# Patient Record
Sex: MALE | Race: WHITE | NOT HISPANIC OR LATINO
[De-identification: names, ages, dates, MRNs, and addresses within clinical notes are randomized per-mention and may not be internally consistent; named-entity substitution may affect disease eponyms.]

---

## 2018-07-17 PROBLEM — Z00.00 ENCOUNTER FOR PREVENTIVE HEALTH EXAMINATION: Noted: 2018-07-17

## 2018-10-23 ENCOUNTER — APPOINTMENT (OUTPATIENT)
Dept: HEART AND VASCULAR | Facility: CLINIC | Age: 53
End: 2018-10-23

## 2018-10-31 ENCOUNTER — APPOINTMENT (OUTPATIENT)
Dept: HEART AND VASCULAR | Facility: CLINIC | Age: 53
End: 2018-10-31
Payer: COMMERCIAL

## 2018-10-31 VITALS — WEIGHT: 140 LBS | BODY MASS INDEX: 23.9 KG/M2 | HEIGHT: 64 IN

## 2018-10-31 VITALS — DIASTOLIC BLOOD PRESSURE: 70 MMHG | SYSTOLIC BLOOD PRESSURE: 120 MMHG

## 2018-10-31 DIAGNOSIS — Z85.51 PERSONAL HISTORY OF MALIGNANT NEOPLASM OF BLADDER: ICD-10-CM

## 2018-10-31 DIAGNOSIS — Z87.09 PERSONAL HISTORY OF OTHER DISEASES OF THE RESPIRATORY SYSTEM: ICD-10-CM

## 2018-10-31 PROCEDURE — 99213 OFFICE O/P EST LOW 20 MIN: CPT

## 2018-12-04 ENCOUNTER — APPOINTMENT (OUTPATIENT)
Dept: HEART AND VASCULAR | Facility: CLINIC | Age: 53
End: 2018-12-04

## 2020-09-14 ENCOUNTER — APPOINTMENT (OUTPATIENT)
Dept: HEART AND VASCULAR | Facility: CLINIC | Age: 55
End: 2020-09-14
Payer: COMMERCIAL

## 2020-09-14 ENCOUNTER — NON-APPOINTMENT (OUTPATIENT)
Age: 55
End: 2020-09-14

## 2020-09-14 VITALS — SYSTOLIC BLOOD PRESSURE: 120 MMHG | DIASTOLIC BLOOD PRESSURE: 70 MMHG

## 2020-09-14 VITALS — HEIGHT: 64 IN | BODY MASS INDEX: 22.2 KG/M2 | WEIGHT: 130 LBS | HEART RATE: 64 BPM | RESPIRATION RATE: 14 BRPM

## 2020-09-14 DIAGNOSIS — Z00.00 ENCOUNTER FOR GENERAL ADULT MEDICAL EXAMINATION W/OUT ABNORMAL FINDINGS: ICD-10-CM

## 2020-09-14 PROCEDURE — 93000 ELECTROCARDIOGRAM COMPLETE: CPT

## 2020-09-14 PROCEDURE — 99214 OFFICE O/P EST MOD 30 MIN: CPT

## 2020-09-14 NOTE — HISTORY OF PRESENT ILLNESS
[FreeTextEntry1] : Patient is a 55-year-old white male with long-standing smoking use was evaluated earlier this year for symptoms of breathlessness ETT done in our office in June and did not reveal any ST changes suggestive of ischemia prior carotid examination did reveal some atherosclerotic plaque.\par Patient is currently down to 10 cigarettes a day from 20\par No recurrent sscp or worseing dyspne a\par Remains at work guarding [employer]

## 2020-09-14 NOTE — PHYSICAL EXAM
[Normal Appearance] : normal appearance [General Appearance - Well Developed] : well developed [Well Groomed] : well groomed [General Appearance - Well Nourished] : well nourished [No Deformities] : no deformities [General Appearance - In No Acute Distress] : no acute distress [Normal Conjunctiva] : the conjunctiva exhibited no abnormalities [Eyelids - No Xanthelasma] : the eyelids demonstrated no xanthelasmas [Normal Oral Mucosa] : normal oral mucosa [No Oral Pallor] : no oral pallor [No Oral Cyanosis] : no oral cyanosis [Normal Jugular Venous A Waves Present] : normal jugular venous A waves present [Normal Jugular Venous V Waves Present] : normal jugular venous V waves present [No Jugular Venous Moss A Waves] : no jugular venous moss A waves [Respiration, Rhythm And Depth] : normal respiratory rhythm and effort [Exaggerated Use Of Accessory Muscles For Inspiration] : no accessory muscle use [Auscultation Breath Sounds / Voice Sounds] : lungs were clear to auscultation bilaterally [Heart Rate And Rhythm] : heart rate and rhythm were normal [Heart Sounds] : normal S1 and S2 [Murmurs] : no murmurs present [Abdomen Soft] : soft [Abdomen Tenderness] : non-tender [] : no hepato-splenomegaly [Abdomen Mass (___ Cm)] : no abdominal mass palpated

## 2020-09-24 ENCOUNTER — APPOINTMENT (OUTPATIENT)
Dept: HEART AND VASCULAR | Facility: CLINIC | Age: 55
End: 2020-09-24

## 2020-10-06 ENCOUNTER — APPOINTMENT (OUTPATIENT)
Dept: HEART AND VASCULAR | Facility: CLINIC | Age: 55
End: 2020-10-06
Payer: COMMERCIAL

## 2020-10-06 PROCEDURE — 93015 CV STRESS TEST SUPVJ I&R: CPT

## 2020-10-15 ENCOUNTER — APPOINTMENT (OUTPATIENT)
Dept: THORACIC SURGERY | Facility: CLINIC | Age: 55
End: 2020-10-15
Payer: COMMERCIAL

## 2020-10-27 VITALS
SYSTOLIC BLOOD PRESSURE: 120 MMHG | WEIGHT: 130 LBS | HEIGHT: 64 IN | BODY MASS INDEX: 22.2 KG/M2 | HEART RATE: 68 BPM | DIASTOLIC BLOOD PRESSURE: 84 MMHG

## 2020-10-29 ENCOUNTER — APPOINTMENT (OUTPATIENT)
Dept: THORACIC SURGERY | Facility: CLINIC | Age: 55
End: 2020-10-29
Payer: COMMERCIAL

## 2020-10-29 VITALS
TEMPERATURE: 97 F | DIASTOLIC BLOOD PRESSURE: 63 MMHG | WEIGHT: 132 LBS | BODY MASS INDEX: 22.53 KG/M2 | HEART RATE: 65 BPM | HEIGHT: 64 IN | RESPIRATION RATE: 17 BRPM | OXYGEN SATURATION: 96 % | SYSTOLIC BLOOD PRESSURE: 117 MMHG

## 2020-10-29 PROCEDURE — 99203 OFFICE O/P NEW LOW 30 MIN: CPT

## 2020-10-29 PROCEDURE — 99072 ADDL SUPL MATRL&STAF TM PHE: CPT

## 2020-10-29 NOTE — PHYSICAL EXAM
[General Appearance - Alert] : alert [General Appearance - In No Acute Distress] : in no acute distress [] : no respiratory distress [Respiration, Rhythm And Depth] : normal respiratory rhythm and effort [Exaggerated Use Of Accessory Muscles For Inspiration] : no accessory muscle use [Bowel Sounds] : normal bowel sounds [Abdomen Soft] : soft [Abnormal Walk] : normal gait [Musculoskeletal - Swelling] : no joint swelling seen [Skin Color & Pigmentation] : normal skin color and pigmentation [Skin Turgor] : normal skin turgor [Oriented To Time, Place, And Person] : oriented to person, place, and time [Impaired Insight] : insight and judgment were intact

## 2020-10-31 NOTE — HISTORY OF PRESENT ILLNESS
[FreeTextEntry1] : 55 year old male, current smoker, with a PMHx of COPD, bladder polyp?, SOB, with recent CT chest revealing slightly improved RML ground glass opacity. He presents today for an initial evaluation. He is referred by Dr. Giles.\par \par CT chest completed on 7/9/2020:\par - continued but slightly improved right middle lobe pulmonary ground glass opacity, liekly infectious of inflammatory\par

## 2020-10-31 NOTE — ASSESSMENT
[FreeTextEntry1] : 55 year old male, current smoker ( 40 years), with a PMHx of COPD, bladder polyp?, SOB, with recent CT chest revealing slightly improved RML ground glass opacity. He presents today for an initial evaluation. He is referred by Dr. Giles.\par \par Today the patient reports feeling generally well. He denies pain, fever, chills, SOB and cough. CT chest completed on 7/9/2020: was reviewed which revealed continued but slightly improved right middle lobe pulmonary ground glass opacity, likely infectious of inflammatory. Compared this scan to his previous CT scans done at [facility]. Emphysema  and flattened diaphragm seen. Discussed the importance of smoking cessation. Will order PFTs and CT chest for further evaluation. Will refer to Dr. Cortes. RTC. \par \par PLAN:\par 1. PFTs\par 2. CT chest\par 3. Refer to Dr Cortes\par 4. Smoking cessation encouraged\par \par Benefits of smoking cessation include:\par \par "1.  Your blood pressure will drop back down to normal within 20 minutes. \par \par 2.  Within eight hours the carbon monoxide levels in your bloodstream should decrease by about one half and oxygen level should return to normal. \par \par 3.  In 48 hours, your chance of having a heart attack will have decreased. All the nicotine will have left your body and your sense of taste and smell should return to a normal level. \par \par 4.  In 72 hours, your bronchial tubes will relax and your energy levels will increase. \par \par 5.  After two weeks, your circulation should increase and it will continue to improve for the next 10 weeks. \par \par 6.  From 3 - 9 months after quitting smoking, coughing, wheezing and breathing problems will dissipate as your lung capacity should improve by 10%. \par \par 7.  In one year, your risk of having a heart attack will have dropped by one half. \par \par 8.  After five years of smoking cessation, your risk of having a stroke returns to that of a non-smoker. \par \par 9.  After 10 years, your risk of lung cancer will return to that of a non-smoker. \par \par 10.  Finally, after 15 years, risk of heart attack will return to that of a non-smoker." - https://excommunity.becomeanex.org; www.cdc.org\par \par I reviewed the documentation recorded by the scribe in my presence and it accurately and completely records my words and actions\par \par

## 2020-10-31 NOTE — END OF VISIT
[FreeTextEntry3] : I, MANOJ WISDOM , am scribing for and in the presence of BRYANT ARORA the following sections: history of present illness, past medical/family/surgical/family/social history, review of systems, vital signs, physical exam, and disposition.\par \par

## 2020-11-02 ENCOUNTER — LABORATORY RESULT (OUTPATIENT)
Age: 55
End: 2020-11-02

## 2020-11-05 ENCOUNTER — OUTPATIENT (OUTPATIENT)
Dept: OUTPATIENT SERVICES | Facility: HOSPITAL | Age: 55
LOS: 1 days | End: 2020-11-05
Payer: COMMERCIAL

## 2020-11-05 ENCOUNTER — APPOINTMENT (OUTPATIENT)
Dept: CT IMAGING | Facility: HOSPITAL | Age: 55
End: 2020-11-05
Payer: COMMERCIAL

## 2020-11-05 DIAGNOSIS — J43.9 EMPHYSEMA, UNSPECIFIED: ICD-10-CM

## 2020-11-05 PROCEDURE — 94726 PLETHYSMOGRAPHY LUNG VOLUMES: CPT | Mod: 26

## 2020-11-05 PROCEDURE — 94726 PLETHYSMOGRAPHY LUNG VOLUMES: CPT

## 2020-11-05 PROCEDURE — 94060 EVALUATION OF WHEEZING: CPT

## 2020-11-05 PROCEDURE — 94760 N-INVAS EAR/PLS OXIMETRY 1: CPT

## 2020-11-05 PROCEDURE — 71250 CT THORAX DX C-: CPT

## 2020-11-05 PROCEDURE — 94729 DIFFUSING CAPACITY: CPT

## 2020-11-05 PROCEDURE — 94010 BREATHING CAPACITY TEST: CPT | Mod: 26

## 2020-11-05 PROCEDURE — 94729 DIFFUSING CAPACITY: CPT | Mod: 26

## 2020-11-05 PROCEDURE — 71250 CT THORAX DX C-: CPT | Mod: 26

## 2020-11-19 ENCOUNTER — APPOINTMENT (OUTPATIENT)
Dept: THORACIC SURGERY | Facility: CLINIC | Age: 55
End: 2020-11-19

## 2020-11-19 NOTE — HISTORY OF PRESENT ILLNESS
[FreeTextEntry1] : 55 year old male, current smoker, with a PMHx of COPD, bladder polyp?, SOB, with recent CT chest revealing slightly improved RML ground glass opacity. He presents today to review CT chest and PFTs. He is referred by Dr. Giles.\par \par CT chest completed on 7/9/2020:\par - continued but slightly improved right middle lobe pulmonary ground glass opacity, likely infectious of inflammatory\par \par CT chest completed on 11/5/20:\par - 1. Mild interval increase in mild mosaic perfusion pattern which may represent air trapping from underlying mild small airways inflammation characterized by scattered tree-in-bud centrilobular micronodules and mild bronchial wall thickening which has increased slightly from prior imaging.\par 2. Stable more discrete calcified as well as solid micronodules. In a low risk individual, no additional imaging surveillance is indicated. If this patient is high risk, optional 12 month surveillance thoracic CT may be in order.\par 3. Calcified as well as noncalcified mild asymmetric gynecomastia right greater than left. Stable increase in the number of nonenlarged bilateral axillary lymph nodes which may be reactive.\par \par PFT done on 11/5/20 showed FEV1 =2.42 , 84% of predicted value, FVC =3.33 , 88% of predicted value, PEF =6.32 ,79 % of predicted value and DLCO =20.4 , 84% of predicted value.\par \par \par

## 2020-11-19 NOTE — ASSESSMENT
[FreeTextEntry1] : 55 year old male, current smoker ( 40 years), with a PMHx of COPD, bladder polyp?, SOB, with recent CT chest revealing slightly improved RML ground glass opacity. He presents today for an initial evaluation. He is referred by Dr. Giles.\par \par Today the patient reports feeling generally well. He denies pain, fever, chills, SOB and cough. CT chest completed on 11/5/20: 1. Mild interval increase in mild mosaic perfusion pattern which may represent air trapping from underlying mild small airways inflammation characterized by scattered tree-in-bud centrilobular micronodules and mild bronchial wall thickening which has increased slightly from prior imaging.2. Stable more discrete calcified as well as solid micronodules. In a low risk individual, no additional imaging surveillance is indicated. If this patient is high risk, optional 12 month surveillance thoracic CT may be in order. 3. Calcified as well as noncalcified mild asymmetric gynecomastia right greater than left. Stable increase in the number of nonenlarged bilateral axillary lymph nodes which may be reactive.\par \par \par

## 2020-12-11 ENCOUNTER — APPOINTMENT (OUTPATIENT)
Dept: PULMONOLOGY | Facility: CLINIC | Age: 55
End: 2020-12-11

## 2021-02-17 ENCOUNTER — APPOINTMENT (OUTPATIENT)
Dept: PULMONOLOGY | Facility: CLINIC | Age: 56
End: 2021-02-17
Payer: COMMERCIAL

## 2021-02-17 VITALS
DIASTOLIC BLOOD PRESSURE: 75 MMHG | TEMPERATURE: 98.1 F | BODY MASS INDEX: 23.9 KG/M2 | HEIGHT: 64 IN | HEART RATE: 72 BPM | OXYGEN SATURATION: 97 % | SYSTOLIC BLOOD PRESSURE: 120 MMHG | WEIGHT: 140 LBS

## 2021-02-17 PROCEDURE — 99072 ADDL SUPL MATRL&STAF TM PHE: CPT

## 2021-02-17 PROCEDURE — 99204 OFFICE O/P NEW MOD 45 MIN: CPT

## 2021-02-17 NOTE — ASSESSMENT
[FreeTextEntry1] : COPD\par \par The patient is a PFT and was normal except for mild decrease in diffusion capacity.  The CT scan of the chest revealed emphysematous changes.  I discussed the case in depth in details with the patient regarding smoke cessation the benefit and the health side effects of smoking which include but not limited to lung cancer head and neck cancer coronary artery disease and renal cancer.  Patient started using the patch and advised the patient use the nicotine gum as needed as long as he is cutting down on the cigarette smoking.  Alerted the patient of of excessive nicotine if he still smokes while using the gum and the patch.  I inflamed I explained to the\par \par I explained to the patient's the CT scan finding regarding emphysematous changes.  I educated the patient emphysema and explained that he has no symptoms in regard to emphysema, the PFT was normal, but there is emphysematous changes.  I also explained the effect of aging on the lung and exacerbation of the aging aging process by smoking.  Patient will require PFT on yearly basis\par \par CT scan\par \par The lung nodules are most likely small airway inflammation.  The patient is a candidate for lung cancer screening program and be involved in that.

## 2021-02-17 NOTE — HISTORY OF PRESENT ILLNESS
[Current] : current [Never] : never [TextBox_4] : Is following on the CAT scan.  He is still smoking but he is cutting down as he was told he has emphysema and the chest x-ray.  He has no shortness of breath no morning cough no wheezing.

## 2021-11-01 ENCOUNTER — NON-APPOINTMENT (OUTPATIENT)
Age: 56
End: 2021-11-01

## 2021-11-01 ENCOUNTER — APPOINTMENT (OUTPATIENT)
Dept: PULMONOLOGY | Facility: CLINIC | Age: 56
End: 2021-11-01
Payer: COMMERCIAL

## 2021-11-01 VITALS — BODY MASS INDEX: 23.9 KG/M2 | HEIGHT: 64 IN | WEIGHT: 140 LBS

## 2021-11-01 PROCEDURE — G0296 VISIT TO DETERM LDCT ELIG: CPT

## 2021-11-01 NOTE — ASSESSMENT
[Discussed Risks and Advised to Quit Smoking] : Discussed risks and advised to quit smoking [Discussed Cessation Medication] : cessation medication was discussed [Discussed Cessation Strategies] : cessation strategies were discussed [Ready] : Patient is ready for cessation intervention [Action] : Action: The patient is actively trying to quit smoking or has quit smoking in the past 6 months

## 2021-11-01 NOTE — PLAN
[Smoking Cessation Guidance Provided] : Smoking cessation guidance was provided to patient [Smoking Cessation] : smoking cessation [FreeTextEntry1] : Plan:\par -Low Dose CT chest for lung cancer screening\par -Follow up with patient and his referring provider after his LDCT results have been reviewed by the multi-disciplinary clinical team\par -Encouraged smoking cessation\par \par Should I Screen? tool utilized. 6 year risk of lung cancer is 2.5%. Patient wishes to proceed with screening.\par \par Engaged in shared decision making with  BULMARO TRUDYMICHAEL . Answered all questions. He verbalized understanding and agreement. He knows to call back with any questions or concerns.

## 2021-11-01 NOTE — REASON FOR VISIT
[Home] : at home, [unfilled] , at the time of the visit. [Medical Office: ([facility])___] : at the medical office located in  [Verbal consent obtained from patient] : the patient, [unfilled] [Initial Evaluation] : an initial evaluation visit [Review of Eligibility] : review of eligibility [Low-Dose CT Screening Discussion] : low-dose CT lung cancer screening discussion [Virtual Visit] : virtual visit

## 2021-11-01 NOTE — HISTORY OF PRESENT ILLNESS
[Current] : current smoker [_____ pack-years] : [unfilled] pack-years [TextBox_13] : Referred by Dr. Maria E Cortes\par \par Mr. BULMARO MCGRAW  is a 56 year old man with a history of COPD, nicotine dependence\par \par He was seen in the office by Dr. Cortes for review of eligibility for, as well as, discussion of Low-Dose CT lung cancer screening program. Over the telephone today we reviewed and confirmed that the patient meets screening eligibility criteria:\par -Age: 56 year \par -Smoking status:\par -Current smoker\par -Number of pack(s) per day: 1\par -Number of years smoked: 41\par -Number of pack years smokin\par \par He is currently smoking 2 cigarettes a day. He is using the patch and nicotine lozenges.\par \par Mr. MCGRAW denies any signs or symptoms of lung cancer including new cough, change in cough, hemoptysis and unintentional weight loss. \par \par Mr. MCGRAW denies any personal history of lung cancer. No lung cancer in a 1st degree relative. History of lung disease: COPD, emphysema. History of occupational exposures: worked by \par  [TextBox_6] : 1 [TextBox_8] : 41

## 2021-11-10 ENCOUNTER — APPOINTMENT (OUTPATIENT)
Dept: CT IMAGING | Facility: CLINIC | Age: 56
End: 2021-11-10
Payer: COMMERCIAL

## 2021-11-10 ENCOUNTER — RESULT REVIEW (OUTPATIENT)
Age: 56
End: 2021-11-10

## 2021-11-10 ENCOUNTER — OUTPATIENT (OUTPATIENT)
Dept: OUTPATIENT SERVICES | Facility: HOSPITAL | Age: 56
LOS: 1 days | End: 2021-11-10

## 2021-11-10 PROCEDURE — 71271 CT THORAX LUNG CANCER SCR C-: CPT | Mod: 26

## 2021-11-11 ENCOUNTER — NON-APPOINTMENT (OUTPATIENT)
Age: 56
End: 2021-11-11

## 2023-01-03 ENCOUNTER — APPOINTMENT (OUTPATIENT)
Dept: PULMONOLOGY | Facility: CLINIC | Age: 58
End: 2023-01-03
Payer: COMMERCIAL

## 2023-01-03 VITALS — WEIGHT: 145 LBS | BODY MASS INDEX: 24.75 KG/M2 | HEIGHT: 64 IN

## 2023-01-03 PROCEDURE — G0296 VISIT TO DETERM LDCT ELIG: CPT

## 2023-01-03 NOTE — PLAN
[FreeTextEntry1] : Plan:\par -Low Dose CT chest for lung cancer screening\par -Follow up with patient and his referring provider after his LDCT results have been reviewed by the multi-disciplinary clinical team\par -Encouraged smoking cessation\par \par Should I Screen? tool utilized. 6 year risk of lung cancer is 2.5%. Patient wishes to proceed with screening.\par \par Engaged in shared decision making with  BULMARO TRUDYMICHAEL . Answered all questions. He verbalized understanding and agreement. He knows to call back with any questions or concerns.

## 2023-01-03 NOTE — HISTORY OF PRESENT ILLNESS
[TextBox_13] : Referred by Dr. Maria E Cortes\par \par Mr. BULMARO MCGRAW is a 58 year old man with a history of COPD, nicotine dependence\par \par We spoke over the phone today for review of eligibility for, as well as, discussion of Low-Dose CT lung cancer screening program. Over the telephone today we reviewed and confirmed that the patient meets screening eligibility criteria:\par -Age: 58 year \par -Smoking status:\par -Current smoker\par -Number of pack(s) per day: 1\par -Number of years smoked: 41\par -Number of pack years smokin\par \par He is currently smoking 2 cigarettes a day. \par \par Mr. MCGRAW denies any signs or symptoms of lung cancer including new cough, change in cough, hemoptysis and unintentional weight loss. \par \par Mr. MCGRAW denies any personal history of lung cancer. No lung cancer in a 1st degree relative. History of lung disease: COPD, emphysema. History of occupational exposures: worked by \par  [PacksperYear] : 41

## 2023-01-12 ENCOUNTER — OUTPATIENT (OUTPATIENT)
Dept: OUTPATIENT SERVICES | Facility: HOSPITAL | Age: 58
LOS: 1 days | End: 2023-01-12
Payer: COMMERCIAL

## 2023-01-12 ENCOUNTER — APPOINTMENT (OUTPATIENT)
Dept: CT IMAGING | Facility: HOSPITAL | Age: 58
End: 2023-01-12

## 2023-01-12 ENCOUNTER — NON-APPOINTMENT (OUTPATIENT)
Age: 58
End: 2023-01-12

## 2023-01-12 PROCEDURE — 71271 CT THORAX LUNG CANCER SCR C-: CPT

## 2023-01-12 PROCEDURE — 71271 CT THORAX LUNG CANCER SCR C-: CPT | Mod: 26

## 2023-01-18 ENCOUNTER — APPOINTMENT (OUTPATIENT)
Dept: PULMONOLOGY | Facility: CLINIC | Age: 58
End: 2023-01-18
Payer: COMMERCIAL

## 2023-01-18 VITALS
SYSTOLIC BLOOD PRESSURE: 125 MMHG | TEMPERATURE: 97.8 F | DIASTOLIC BLOOD PRESSURE: 72 MMHG | OXYGEN SATURATION: 97 % | HEIGHT: 64 IN | WEIGHT: 142 LBS | BODY MASS INDEX: 24.24 KG/M2 | HEART RATE: 78 BPM

## 2023-01-18 DIAGNOSIS — R09.82 POSTNASAL DRIP: ICD-10-CM

## 2023-01-18 PROCEDURE — 99214 OFFICE O/P EST MOD 30 MIN: CPT

## 2023-01-18 RX ORDER — SODIUM CHLORIDE 0.65 %
0.65 AEROSOL, SPRAY (ML) NASAL TWICE DAILY
Qty: 1 | Refills: 1 | Status: ACTIVE | COMMUNITY
Start: 2023-01-18 | End: 1900-01-01

## 2023-01-18 RX ORDER — FLUTICASONE PROPIONATE 50 UG/1
50 SPRAY, METERED NASAL TWICE DAILY
Qty: 1 | Refills: 2 | Status: ACTIVE | COMMUNITY
Start: 2023-01-18 | End: 1900-01-01

## 2023-01-18 NOTE — END OF VISIT
[>50% of the face to face encounter time was spent on counseling and/or coordination of care for ___] : Greater than 50% of the face to face encounter time was spent on counseling and/or coordination of care for [unfilled] [FreeTextEntry3] : Pt seen with REN Dias and agree on the above plan of care.

## 2023-01-18 NOTE — PHYSICAL EXAM
[No Acute Distress] : no acute distress [Erythema] : erythema [Nasal congestion] : nasal congestion [Normal Appearance] : normal appearance [No Neck Mass] : no neck mass [Normal Rate/Rhythm] : normal rate/rhythm [Normal S1, S2] : normal s1, s2 [No Murmurs] : no murmurs [No Resp Distress] : no resp distress [Clear to Auscultation Bilaterally] : clear to auscultation bilaterally [No Abnormalities] : no abnormalities [Benign] : benign [Normal Gait] : normal gait [No Clubbing] : no clubbing [No Cyanosis] : no cyanosis [No Edema] : no edema [FROM] : FROM [Normal Color/ Pigmentation] : normal color/ pigmentation [No Focal Deficits] : no focal deficits [Oriented x3] : oriented x3 [Normal Affect] : normal affect

## 2023-01-18 NOTE — ASSESSMENT
[FreeTextEntry1] : Reviewed CT scan with faint GGO no signs of pulmonary nodules.  Will repeat in 3 months to further evaluate the GGO.  pt with Post nasal gtt related to cough will treat with Flonase, sodium chloride nasal spray and Claritin.   Pt will update me on the cough in 2 weeks.  \par \par Follow with CT scan 3 months for GGO.  If all negative will follow with yearly lung CA CT scan.

## 2023-01-18 NOTE — HISTORY OF PRESENT ILLNESS
[Current] : current [Never] : never [TextBox_4] : he was fine till one month ago when he started coughing.  He was coughing at the time of the CT scan of the chest.  The cough responds to the cough syrup.  No fever, chills, night sweets. He chess for the covid every other day due to his work.  NO wheezing nor sob. He has a runny nose.  No sore throat.  No acid reflux [ESS] : 0

## 2023-02-07 ENCOUNTER — APPOINTMENT (OUTPATIENT)
Dept: PULMONOLOGY | Facility: CLINIC | Age: 58
End: 2023-02-07

## 2023-03-07 ENCOUNTER — APPOINTMENT (OUTPATIENT)
Dept: PULMONOLOGY | Facility: CLINIC | Age: 58
End: 2023-03-07
Payer: COMMERCIAL

## 2023-03-07 VITALS
HEART RATE: 63 BPM | OXYGEN SATURATION: 98 % | HEIGHT: 64 IN | WEIGHT: 138 LBS | DIASTOLIC BLOOD PRESSURE: 74 MMHG | SYSTOLIC BLOOD PRESSURE: 118 MMHG | TEMPERATURE: 97.5 F | BODY MASS INDEX: 23.56 KG/M2

## 2023-03-07 PROCEDURE — 99214 OFFICE O/P EST MOD 30 MIN: CPT

## 2023-03-07 NOTE — ASSESSMENT
[FreeTextEntry1] : Reviewed CT scan with faint GGO no signs of pulmonary nodules.  Will repeat in 3 months to further evaluate the GGO.  Pt post nasal gtt and cough have resolved. Pt is cutting down on his smoking, smoking 2 cigs a day. \par \par Follow with CT scan 3 months for GGO.  If all negative will follow with yearly lung CA CT scan. \par \par Follow up in 3 month with with visit and PFT same day.

## 2023-03-07 NOTE — HISTORY OF PRESENT ILLNESS
[Current] : current [Never] : never [TextBox_4] : 58 yr old male current smoker 2 cigs a day (40 pack yrs)  with GGO on imaging, Pulmonary nodule and emphysema.  Presents for follow up. \par \par He was seen by PCP 3 weeks ago was coughing and post nasal gtt.  He was given antibiotics which cleared up.  He is following with the 911 group and following up with them in june.  No hospitalization or urgent care visit. \par \par He denies SOB, coughing, wheezing, fever, chest pain.  He walks about 5 miles a day he works at [employer] and walks daily.  He is no longer using nasal sprays or Claritin.  \par \par  [ESS] : 0

## 2023-03-27 ENCOUNTER — APPOINTMENT (OUTPATIENT)
Dept: HEART AND VASCULAR | Facility: CLINIC | Age: 58
End: 2023-03-27
Payer: COMMERCIAL

## 2023-03-27 ENCOUNTER — NON-APPOINTMENT (OUTPATIENT)
Age: 58
End: 2023-03-27

## 2023-03-27 VITALS
DIASTOLIC BLOOD PRESSURE: 78 MMHG | BODY MASS INDEX: 24.41 KG/M2 | RESPIRATION RATE: 17 BRPM | SYSTOLIC BLOOD PRESSURE: 120 MMHG | HEART RATE: 75 BPM | WEIGHT: 143 LBS | HEIGHT: 64 IN

## 2023-03-27 PROCEDURE — 93000 ELECTROCARDIOGRAM COMPLETE: CPT

## 2023-03-27 PROCEDURE — 93306 TTE W/DOPPLER COMPLETE: CPT

## 2023-03-27 PROCEDURE — 99214 OFFICE O/P EST MOD 30 MIN: CPT | Mod: 25

## 2023-03-27 NOTE — DISCUSSION/SUMMARY
[EKG obtained to assist in diagnosis and management of assessed problem(s)] : EKG obtained to assist in diagnosis and management of assessed problem(s) [FreeTextEntry1] : Assessment  Smoker  smoking cessation discussed  EchoLVFX 55% NO VHD  Ct chest minimal aortic plaque NO Coronary calcification  Risk factor modification discussed.

## 2023-03-27 NOTE — HISTORY OF PRESENT ILLNESS
[FreeTextEntry1] : 09/14/20\par Patient is a 55-year-old white male with long-standing smoking use was evaluated earlier this year for symptoms of breathlessness ETT done in our office in June and did not reveal any ST changes suggestive of ischemia prior carotid examination did reveal some atherosclerotic plaque.\par Patient is currently down to 10 cigarettes a day from 20\par No recurrent sscp or worseing dyspnea\par Remains at work [employer]ing [employer] \par \par 03/27/23\par 58 prior copd not on meds \par smoker 1ppd \par on patch for smoking cessation  \par mild RAND no angina \par Has Bloods done w  Dr BE \par Has had calf pain w walking stairs resolves w rest \par recent Ct chest for lung cancer revealed no coronary calcium \par Mild aortic calcium noted

## 2023-03-27 NOTE — ADDENDUM
[FreeTextEntry1] : I, Lanre Howard, assisted in documentation on 03/27/2023 acting as a scribe for Dr. Zhao Michael.\par \par

## 2023-03-27 NOTE — ASSESSMENT
[FreeTextEntry1] : Encounter for preventive health examination (V70.0) (Z00.00)\par Atherosclerosis of other arteries (440.8) (I70.8)\par \par Assessment \par Smoker \par smoking cessation discussed \par EchoLVFX 55% NO VHD \par Ct chest minimal aortic plaque\par NO Coronary calcification \par Risk factor modification discussed. \par ETT orderd \par Duplex of legs to assess PVD \par

## 2023-04-25 ENCOUNTER — APPOINTMENT (OUTPATIENT)
Dept: CT IMAGING | Facility: HOSPITAL | Age: 58
End: 2023-04-25

## 2023-04-25 ENCOUNTER — APPOINTMENT (OUTPATIENT)
Dept: HEART AND VASCULAR | Facility: CLINIC | Age: 58
End: 2023-04-25
Payer: COMMERCIAL

## 2023-04-25 ENCOUNTER — OUTPATIENT (OUTPATIENT)
Dept: OUTPATIENT SERVICES | Facility: HOSPITAL | Age: 58
LOS: 1 days | End: 2023-04-25
Payer: COMMERCIAL

## 2023-04-25 PROCEDURE — 71250 CT THORAX DX C-: CPT | Mod: 26

## 2023-04-25 PROCEDURE — 93925 LOWER EXTREMITY STUDY: CPT

## 2023-04-25 PROCEDURE — 93015 CV STRESS TEST SUPVJ I&R: CPT

## 2023-04-25 PROCEDURE — 71250 CT THORAX DX C-: CPT

## 2023-04-25 PROCEDURE — ZZZZZ: CPT

## 2023-04-25 RX ORDER — CILOSTAZOL 50 MG/1
50 TABLET ORAL
Qty: 60 | Refills: 3 | Status: ACTIVE | COMMUNITY
Start: 2023-04-25 | End: 1900-01-01

## 2023-06-12 ENCOUNTER — APPOINTMENT (OUTPATIENT)
Dept: PULMONOLOGY | Facility: CLINIC | Age: 58
End: 2023-06-12
Payer: COMMERCIAL

## 2023-06-12 VITALS
WEIGHT: 134 LBS | DIASTOLIC BLOOD PRESSURE: 72 MMHG | OXYGEN SATURATION: 97 % | HEART RATE: 67 BPM | HEIGHT: 64 IN | BODY MASS INDEX: 22.88 KG/M2 | SYSTOLIC BLOOD PRESSURE: 109 MMHG

## 2023-06-12 DIAGNOSIS — J43.9 EMPHYSEMA, UNSPECIFIED: ICD-10-CM

## 2023-06-12 DIAGNOSIS — R91.1 SOLITARY PULMONARY NODULE: ICD-10-CM

## 2023-06-12 DIAGNOSIS — R91.8 OTHER NONSPECIFIC ABNORMAL FINDING OF LUNG FIELD: ICD-10-CM

## 2023-06-12 PROCEDURE — ZZZZZ: CPT

## 2023-06-12 PROCEDURE — 94729 DIFFUSING CAPACITY: CPT

## 2023-06-12 PROCEDURE — 94010 BREATHING CAPACITY TEST: CPT

## 2023-06-12 PROCEDURE — 99214 OFFICE O/P EST MOD 30 MIN: CPT | Mod: 25

## 2023-06-12 PROCEDURE — 94727 GAS DIL/WSHOT DETER LNG VOL: CPT

## 2023-06-12 NOTE — END OF VISIT
[>50% of the face to face encounter time was spent on counseling and/or coordination of care for ___] : Greater than 50% of the face to face encounter time was spent on counseling and/or coordination of care for [unfilled] [FreeTextEntry3] : Pt seen with REN Dias and agree on the above plan of care.   The patient is clinically stable.  Discussed the PFT and the 6-minute walk with him.  The patient will cut down in the 2 cigarettes a day.  Follow-up in the lung cancer screening [Time Spent: ___ minutes] : I have spent [unfilled] minutes of time on the encounter.

## 2023-06-12 NOTE — HISTORY OF PRESENT ILLNESS
[Current] : current [Never] : never [TextBox_4] : 58 yr old male current smoker 2 cigs a day (40 pack yrs)  with GGO on imaging, Pulmonary nodule and emphysema.  Presents for follow up.  \par \par He is going to quit July 1st.   He is following with the 911 group.  No hospitalization or urgent care visit or steroids. \par \par He denies SOB, coughing, wheezing, fever, chest pain.  He walks about 5 miles a day he works at [employer] and walks daily.  \par  [ESS] : 0 Statement Selected

## 2023-06-12 NOTE — ASSESSMENT
[FreeTextEntry1] :  Pt is cutting down on his smoking, smoking 2 cigs a day and has a quit date of July 1, 2024.  CT scan 4/2023 showed resolution of previous left lung base groundglass opacities.  Discussed with pt he qualifies for lung CA screening CT scans.  Put in order for 4/2024.  \par \par PFT today revealed normal henry, TLC and DLCO.  Pt 6 min walk was negative walked 493 meters without desaturation.  No need for inhalers at this time, but he needs to quit smoking.  Follow up in 6 months.  \par \par \par

## 2023-07-18 ENCOUNTER — APPOINTMENT (OUTPATIENT)
Dept: CT IMAGING | Facility: HOSPITAL | Age: 58
End: 2023-07-18
Payer: COMMERCIAL

## 2023-07-18 ENCOUNTER — OUTPATIENT (OUTPATIENT)
Dept: OUTPATIENT SERVICES | Facility: HOSPITAL | Age: 58
LOS: 1 days | End: 2023-07-18
Payer: COMMERCIAL

## 2023-07-18 PROCEDURE — 75571 CT HRT W/O DYE W/CA TEST: CPT

## 2023-07-18 PROCEDURE — 75571 CT HRT W/O DYE W/CA TEST: CPT | Mod: 26

## 2023-08-09 NOTE — REASON FOR VISIT
This document is complete and the patient is ready for discharge. [Follow-Up] : a follow-up visit [Abnormal CXR/ Chest CT] : an abnormal CXR/ chest CT

## 2023-12-01 ENCOUNTER — APPOINTMENT (OUTPATIENT)
Dept: HEART AND VASCULAR | Facility: CLINIC | Age: 58
End: 2023-12-01
Payer: COMMERCIAL

## 2023-12-01 VITALS
HEIGHT: 64 IN | WEIGHT: 130 LBS | SYSTOLIC BLOOD PRESSURE: 120 MMHG | DIASTOLIC BLOOD PRESSURE: 80 MMHG | BODY MASS INDEX: 22.2 KG/M2

## 2023-12-01 PROCEDURE — 93000 ELECTROCARDIOGRAM COMPLETE: CPT

## 2023-12-01 PROCEDURE — 99214 OFFICE O/P EST MOD 30 MIN: CPT | Mod: 25

## 2023-12-12 ENCOUNTER — APPOINTMENT (OUTPATIENT)
Dept: PULMONOLOGY | Facility: CLINIC | Age: 58
End: 2023-12-12

## 2024-02-20 ENCOUNTER — NON-APPOINTMENT (OUTPATIENT)
Age: 59
End: 2024-02-20

## 2024-02-20 ENCOUNTER — APPOINTMENT (OUTPATIENT)
Dept: HEART AND VASCULAR | Facility: CLINIC | Age: 59
End: 2024-02-20
Payer: COMMERCIAL

## 2024-02-20 VITALS
WEIGHT: 134 LBS | SYSTOLIC BLOOD PRESSURE: 110 MMHG | BODY MASS INDEX: 22.88 KG/M2 | HEIGHT: 64 IN | HEART RATE: 58 BPM | DIASTOLIC BLOOD PRESSURE: 72 MMHG

## 2024-02-20 DIAGNOSIS — I73.9 PERIPHERAL VASCULAR DISEASE, UNSPECIFIED: ICD-10-CM

## 2024-02-20 DIAGNOSIS — I70.8 ATHEROSCLEROSIS OF OTHER ARTERIES: ICD-10-CM

## 2024-02-20 DIAGNOSIS — R07.9 CHEST PAIN, UNSPECIFIED: ICD-10-CM

## 2024-02-20 DIAGNOSIS — R09.89 OTHER SPECIFIED SYMPTOMS AND SIGNS INVOLVING THE CIRCULATORY AND RESPIRATORY SYSTEMS: ICD-10-CM

## 2024-02-20 PROCEDURE — ZZZZZ: CPT

## 2024-02-20 PROCEDURE — 93880 EXTRACRANIAL BILAT STUDY: CPT

## 2024-02-20 PROCEDURE — 93308 TTE F-UP OR LMTD: CPT

## 2024-02-20 PROCEDURE — 93000 ELECTROCARDIOGRAM COMPLETE: CPT

## 2024-02-20 PROCEDURE — 99214 OFFICE O/P EST MOD 30 MIN: CPT | Mod: 25

## 2024-02-20 PROCEDURE — 93325 DOPPLER ECHO COLOR FLOW MAPG: CPT

## 2024-02-20 PROCEDURE — 93321 DOPPLER ECHO F-UP/LMTD STD: CPT

## 2024-02-20 RX ORDER — ROSUVASTATIN CALCIUM 5 MG/1
5 TABLET, FILM COATED ORAL
Refills: 0 | Status: ACTIVE | COMMUNITY

## 2024-02-20 RX ORDER — RIVAROXABAN 2.5 MG/1
2.5 TABLET, FILM COATED ORAL
Refills: 0 | Status: ACTIVE | COMMUNITY

## 2024-02-20 RX ORDER — ASPIRIN 81 MG/1
81 TABLET, COATED ORAL
Qty: 30 | Refills: 5 | Status: ACTIVE | COMMUNITY
Start: 2023-04-25

## 2024-02-20 NOTE — HISTORY OF PRESENT ILLNESS
[FreeTextEntry1] : 09/14/20 Patient is a 55-year-old white male with long-standing smoking use was evaluated earlier this year for symptoms of breathlessness ETT done in our office in June and did not reveal any ST changes suggestive of ischemia prior carotid examination did reveal some atherosclerotic plaque. 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

## 2024-02-20 NOTE — DISCUSSION/SUMMARY
[FreeTextEntry1] : Assessment  Smoker  smoking cessation discussed   Lvef 55% MILD TR MR  Carotid < 50 %  contune exercise  target LDL < 55  continue xarelto and  asa and sttain [EKG obtained to assist in diagnosis and management of assessed problem(s)] : EKG obtained to assist in diagnosis and management of assessed problem(s)

## 2024-02-20 NOTE — REASON FOR VISIT
[Symptom and Test Evaluation] : symptom and test evaluation [Other: ____] : [unfilled] [FreeTextEntry3] : MANJEET Giles  [FreeTextEntry1] : Copd \par  dyspnea

## 2024-04-12 ENCOUNTER — APPOINTMENT (OUTPATIENT)
Dept: PULMONOLOGY | Facility: CLINIC | Age: 59
End: 2024-04-12
Payer: COMMERCIAL

## 2024-04-12 ENCOUNTER — NON-APPOINTMENT (OUTPATIENT)
Age: 59
End: 2024-04-12

## 2024-04-12 VITALS — WEIGHT: 134 LBS | BODY MASS INDEX: 22.88 KG/M2 | HEIGHT: 64 IN

## 2024-04-12 DIAGNOSIS — F17.210 NICOTINE DEPENDENCE, CIGARETTES, UNCOMPLICATED: ICD-10-CM

## 2024-04-12 DIAGNOSIS — Z87.891 PERSONAL HISTORY OF NICOTINE DEPENDENCE: ICD-10-CM

## 2024-04-12 PROCEDURE — G0296 VISIT TO DETERM LDCT ELIG: CPT

## 2024-04-12 NOTE — PLAN
[FreeTextEntry1] : Plan: -Low Dose CT chest for lung cancer screening -Follow up with patient and his referring provider after his LDCT results have been reviewed by the multi-disciplinary clinical team -Encouraged continued smoking abstinence   Engaged in shared decision making with Mr. BULMARO MCGRAW . Answered all questions. He verbalized understanding and agreement. He knows to call back with any questions or concerns

## 2024-04-12 NOTE — HISTORY OF PRESENT ILLNESS
[TextBox_13] : Referred by Dr. Maria E Cortes  Mr. BULMARO MCGRAW is a 59 year old man with a history of COPD, nicotine dependence  We spoke over the phone today for review of eligibility for, as well as, discussion of Low-Dose CT lung cancer screening program. Over the telephone today we reviewed and confirmed that the patient meets screening eligibility criteria:  -Age: 59 year -Smoking status: -Former smoker -Number of pack(s) per day: 1 -Number of years smoked: 41 -Number of pack years smokin -Quit year  -# years since quittin months  Mr. MCGRAW denies any signs or symptoms of lung cancer including new cough, change in cough, hemoptysis and unintentional weight loss.  Mr. MCGRAW denies any personal history of lung cancer. No lung cancer in a 1st degree relative. History of lung disease: COPD, emphysema. History of occupational exposures: worked by    [YearQuit] : 2023 [PacksperYear] : 41

## 2024-04-23 ENCOUNTER — APPOINTMENT (OUTPATIENT)
Dept: CT IMAGING | Facility: HOSPITAL | Age: 59
End: 2024-04-23

## 2024-04-23 ENCOUNTER — OUTPATIENT (OUTPATIENT)
Dept: OUTPATIENT SERVICES | Facility: HOSPITAL | Age: 59
LOS: 1 days | End: 2024-04-23
Payer: COMMERCIAL

## 2024-04-23 PROCEDURE — 71271 CT THORAX LUNG CANCER SCR C-: CPT | Mod: 26

## 2024-04-23 PROCEDURE — 71271 CT THORAX LUNG CANCER SCR C-: CPT

## 2024-07-21 ENCOUNTER — NON-APPOINTMENT (OUTPATIENT)
Age: 59
End: 2024-07-21

## 2024-07-22 ENCOUNTER — APPOINTMENT (OUTPATIENT)
Dept: PULMONOLOGY | Facility: CLINIC | Age: 59
End: 2024-07-22
Payer: COMMERCIAL

## 2024-07-22 VITALS
TEMPERATURE: 97.3 F | OXYGEN SATURATION: 97 % | HEIGHT: 64 IN | WEIGHT: 141 LBS | SYSTOLIC BLOOD PRESSURE: 102 MMHG | DIASTOLIC BLOOD PRESSURE: 70 MMHG | BODY MASS INDEX: 24.07 KG/M2 | HEART RATE: 66 BPM

## 2024-07-22 DIAGNOSIS — F17.200 NICOTINE DEPENDENCE, UNSPECIFIED, UNCOMPLICATED: ICD-10-CM

## 2024-07-22 DIAGNOSIS — I82.469 UNSP CALF MUSCULAR VEIN ACUTE EMBOLISM AND THROMBOSIS: ICD-10-CM

## 2024-07-22 DIAGNOSIS — I82.409 ACUTE EMBOLISM AND THROMBOSIS OF UNSPECIFIED DEEP VEINS OF UNSPECIFIED LOWER EXTREMITY: ICD-10-CM

## 2024-07-22 PROCEDURE — 99214 OFFICE O/P EST MOD 30 MIN: CPT

## 2024-07-22 RX ORDER — VARENICLINE 0.5 MG/1
0.5 TABLET, FILM COATED ORAL TWICE DAILY
Qty: 60 | Refills: 1 | Status: ACTIVE | COMMUNITY
Start: 2024-07-22 | End: 1900-01-01

## 2024-07-23 PROBLEM — I82.469: Status: ACTIVE | Noted: 2024-07-23

## 2024-07-23 PROBLEM — I82.409 DVT (DEEP VENOUS THROMBOSIS): Status: ACTIVE | Noted: 2024-07-23

## 2024-07-23 NOTE — ASSESSMENT
[FreeTextEntry1] : 59 yom with medical history of bladder polyps removed on 2015, current smoker 2 cigs a day (40 pack yrs) with GGO on imaging. Former patient of  Dr. Cortes last time seen on 06/23. Pt is here to establish care for emphysema.    CT chest 04/23/24: Findings: Minimal inspissated mucus along the lateral walls of the tracheal air column (5:33) within the thoracic inlet region. There is mild paraseptal emphysema. Previously noted tree-in-bud micronodules and nonspecific groundglass opacities have resolved within the anteromedial basal segment of the left lower lobe. No developing suspicious parenchymal nodules or masses. Impression: Interval resolution of a focus of small airways inflammation involving anteromedial basal segment left lower lobe. No developing suspicious parenchymal nodules or masses. Lung-RADS category: 1 - Negative. No lung nodules or nodule with benign features.  Recommendation:12-month screening LDCT. Enrolled in LDCT next Ct chest due for 04/23/25  PFT scheduled for August, order sent. Will task [facility] clinic to add 6MWT to testing. Pt will call us when it is done to  review. Encouraged to quit smoking. Prescribed Chantix 0.5 mg tablet daily for a week followed by 0.5 mg BID and continue nicotine patches. Discussed side effects of Chantix such as nightmares. Pt will call the clinic if it happens.   Unclear h/o DVT, no documented testing available for a review. Patient advised to follow with PCP regarding clarification and repeat of DVT study. Arterial doppler results noted, there is >75% stenosis of distal SFA. It is possible that xarelto was initiated for this reason. Patient advised to clarify with PCP.  O/V after test and initiating Chantix 2 months later.

## 2024-07-23 NOTE — ASSESSMENT
[FreeTextEntry1] : 59 yom with medical history of bladder polyps removed on 2015, current smoker 2 cigs a day (40 pack yrs) with GGO on imaging. Former patient of  Dr. Cortes last time seen on 06/23. Pt is here to establish care for emphysema.    CT chest 04/23/24: Findings: Minimal inspissated mucus along the lateral walls of the tracheal air column (5:33) within the thoracic inlet region. There is mild paraseptal emphysema. Previously noted tree-in-bud micronodules and nonspecific groundglass opacities have resolved within the anteromedial basal segment of the left lower lobe. No developing suspicious parenchymal nodules or masses. Impression: Interval resolution of a focus of small airways inflammation involving anteromedial basal segment left lower lobe. No developing suspicious parenchymal nodules or masses. Lung-RADS category: 1 - Negative. No lung nodules or nodule with benign features.  Recommendation:12-month screening LDCT. Enrolled in LDCT next Ct chest due for 04/23/25  PFT scheduled for August, order sent. Will task [facility] clinic to add 6MWT to testing. Pt will call us when it is done to  review. Encouraged to quit smoking. Prescribed Chantix 0.5 mg tablet daily for a week followed by 0.5 mg BID and continue nicotine patches. Discussed side effects of Chantix such as nightmares. Pt will call the clinic if it happens.   Unclear h/o DVT, no documented testing available for a review. Patient advised to follow with PCP regarding clarification and repeat of DVT study. Arterial doppler results noted, there is >75% stenosis of distal SFA. It is possible that xarelto was initiated for this reason. Patient advised to clarify with PCP.  O/V after test and initiating Chantix 2 months later.

## 2024-07-23 NOTE — REVIEW OF SYSTEMS
[Cough] : cough [Negative] : Endocrine [Dyspnea] : no dyspnea [SOB on Exertion] : no sob on exertion [Edema] : no edema [Palpitations] : no palpitations [TextBox_30] : occasional cough  [TextBox_110] : dvt on xarelto

## 2024-07-23 NOTE — ASSESSMENT
[FreeTextEntry1] : 59 yom with medical history of bladder polyps removed on 2015, current smoker 2 cigs a day (40 pack yrs) with GGO on imaging. Former patient of  Dr. Cortes last time seen on 06/23. Pt is here to establish care for emphysema.    CT chest 04/23/24: Findings: Minimal inspissated mucus along the lateral walls of the tracheal air column (5:33) within the thoracic inlet region. There is mild paraseptal emphysema. Previously noted tree-in-bud micronodules and nonspecific groundglass opacities have resolved within the anteromedial basal segment of the left lower lobe. No developing suspicious parenchymal nodules or masses. Impression: Interval resolution of a focus of small airways inflammation involving anteromedial basal segment left lower lobe. No developing suspicious parenchymal nodules or masses. Lung-RADS category: 1 - Negative. No lung nodules or nodule with benign features.  Recommendation:12-month screening LDCT. Enrolled in LDCT next Ct chest due for 04/23/25  PFT scheduled for August, order sent. Will task [facility] clinic to add 6MWT to testing. Pt will call us when it is done to  review. Encouraged to quit smoking. Prescribed Chantix 0.5 mg tablet daily for a week followed by 0.5 mg BID and continue nicotine patches. Discussed side effects of Chantix such as nightmares. Pt will call the clinic if it happens.   Unclear h/o DVT, no documented testing available for a review. Patient advised to follow with PCP regarding clarification and repeat of DVT study. Arterial doppler results noted, there is >75% stenosis of distal SFA. It is possible that xarelto was initiated for this reason. Patient advised to clarify with PCP.  O/V after test and initiating Chantix 2 months later.

## 2024-07-23 NOTE — HISTORY OF PRESENT ILLNESS
[Current] : current [Never] : never [TextBox_4] :  59 yom with medical history of bladder polyps removed on , current smoker 2 cigs a day (40 pack yrs) with GGO on imaging. Former patient of  Dr. Cortes last time seen on . Pt is here to establish care for emphysema.   Occasional dry cough at night when laying flat. Takes night quill for relief. No other sx. ET: walks about 2 miles a day and walks daily able to do 5 subway steps.  Used to smoke 1/2 pack per day for 2 years ago and now 2 cigs a day. Uses nicotine patches twice a week which he gets it through 311. Pt is interested in trying anti-smoking aid however no quit date. Exposed to 911 aftermath. Approved for [facility]. Pt is scheduled for PFT through [facility] in .  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     [TextBox_11] : 2 cig [TextBox_13] : 40

## 2024-07-23 NOTE — END OF VISIT
[FreeTextEntry3] : NP participated on patient's encounter.NP participated on patient's encounter.  I, personally performed the evaluation and management (E/M) services for this new patient.  That E/M includes conducting the initial examination, assessing all conditions, and establishing the plan of care.  Today, my ACP was here to observe and participate on evaluation and management services for this patient to be followed going forward. [Time Spent: ___ minutes] : I have spent [unfilled] minutes of time on the encounter. No pertinent family history in first degree relatives

## 2024-07-23 NOTE — HISTORY OF PRESENT ILLNESS
[Current] : current [Never] : never [TextBox_4] :  59 yom with medical history of bladder polyps removed on , current smoker 2 cigs a day (40 pack yrs) with GGO on imaging. Former patient of  Dr. Cortes last time seen on . Pt is here to establish care for emphysema.   Occasional dry cough at night when laying flat. Takes night quill for relief. No other sx. ET: walks about 2 miles a day and walks daily able to do 5 subway steps.  Used to smoke 1/2 pack per day for 2 years ago and now 2 cigs a day. Uses nicotine patches twice a week which he gets it through 311. Pt is interested in trying anti-smoking aid however no quit date. Exposed to 911 aftermath. Approved for [facility]. Pt is scheduled for PFT through [facility] in .  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     [TextBox_11] : 2 cig [TextBox_13] : 40

## 2024-07-23 NOTE — PHYSICAL EXAM
[No Acute Distress] : no acute distress [Normal Oropharynx] : normal oropharynx [Normal Appearance] : normal appearance [No Neck Mass] : no neck mass [Normal Rate/Rhythm] : normal rate/rhythm [Normal S1, S2] : normal s1, s2 [No Murmurs] : no murmurs [No Resp Distress] : no resp distress [Clear to Auscultation Bilaterally] : clear to auscultation bilaterally [No Abnormalities] : no abnormalities [Benign] : benign [Normal Gait] : normal gait [No Clubbing] : no clubbing [No Cyanosis] : no cyanosis [No Edema] : no edema [FROM] : FROM [Normal Color/ Pigmentation] : normal color/ pigmentation [No Focal Deficits] : no focal deficits [Oriented x3] : oriented x3 [Normal Affect] : normal affect [Well Groomed] : well groomed

## 2024-07-23 NOTE — HISTORY OF PRESENT ILLNESS
[Current] : current [Never] : never [TextBox_4] :  59 yom with medical history of bladder polyps removed on , current smoker 2 cigs a day (40 pack yrs) with GGO on imaging. Former patient of  Dr. Cortes last time seen on . Pt is here to establish care for emphysema.   Occasional dry cough at night when laying flat. Takes night quill for relief. No other sx. ET: walks about 2 miles a day and walks daily able to do 5 subway steps.  Used to smoke 1/2 pack per day for 2 years ago and now 2 cigs a day. Uses nicotine patches twice a week which he gets it through 311. Pt is interested in trying anti-smoking aid however no quit date. Exposed to 911 aftermath. Approved for [facility]. Pt is scheduled for PFT through [facility] in .  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     [TextBox_11] : 2 cig [TextBox_13] : 40

## 2024-08-20 ENCOUNTER — APPOINTMENT (OUTPATIENT)
Dept: HEART AND VASCULAR | Facility: CLINIC | Age: 59
End: 2024-08-20
Payer: COMMERCIAL

## 2024-08-20 ENCOUNTER — NON-APPOINTMENT (OUTPATIENT)
Age: 59
End: 2024-08-20

## 2024-08-20 VITALS
RESPIRATION RATE: 17 BRPM | WEIGHT: 137 LBS | SYSTOLIC BLOOD PRESSURE: 110 MMHG | HEART RATE: 57 BPM | HEIGHT: 64 IN | BODY MASS INDEX: 23.39 KG/M2 | DIASTOLIC BLOOD PRESSURE: 70 MMHG

## 2024-08-20 DIAGNOSIS — Z00.00 ENCOUNTER FOR GENERAL ADULT MEDICAL EXAMINATION W/OUT ABNORMAL FINDINGS: ICD-10-CM

## 2024-08-20 PROCEDURE — 93000 ELECTROCARDIOGRAM COMPLETE: CPT

## 2024-08-20 PROCEDURE — G2211 COMPLEX E/M VISIT ADD ON: CPT | Mod: NC

## 2024-08-20 PROCEDURE — 99214 OFFICE O/P EST MOD 30 MIN: CPT | Mod: 25

## 2024-08-20 RX ORDER — RIVAROXABAN 2.5 MG/1
2.5 TABLET, FILM COATED ORAL
Refills: 0 | Status: ACTIVE | COMMUNITY

## 2024-08-20 NOTE — HISTORY OF PRESENT ILLNESS
[FreeTextEntry1] : 09/14/20 Patient is a 55-year-old white male with long-standing smoking use was evaluated earlier this year for symptoms of breathlessness ETT done in our office in June and did not reveal any ST changes suggestive of ischemia prior carotid examination did reveal some atherosclerotic plaque. Patient is currently down to 10 cigarettes a day from 20 No recurrent sscp or worseing dyspnea Remains at work guarding [employer]   03/27/23 58 prior copd not on meds  smoker 1ppd  on patch for smoking cessation   mild RAND no angina  Has Bloods done w  Dr BE  Has had calf pain w walking stairs resolves w rest  recent Ct chest for lung cancer revealed no coronary calcium  Mild aortic calcium noted  12/01/23 has leg pain w 4 stains no sscp or sob  on xarelto and statin for pvd  < 1/2 ppd  trying to smoke  2/20/24 was seen by [facility] no intervention recommended  uses treadmill 20 min qd no leg pain  has pain in legs when climbing stairs  LLE disease 80% with  nl [place]  RLE 50-69 %  08/20/24 seen by [facility] for Phlebitis  is xarelto 2.5 bid  has claudication w steps bilat  resolves w rest  has known PVD

## 2024-08-20 NOTE — ADDENDUM
[FreeTextEntry1] : Kendell Blake assisted in documentation on 08/20/24 acting as a scribe for Dr. Zhao Michael.

## 2024-08-20 NOTE — ASSESSMENT
[FreeTextEntry1] : Lifestyle changes for control of BP reviewed ie wgt reduction, salt restriction, Etoh avoidance, exercise 30 min aerobic activity per day, avoid NSAID use self monitor BP TIW Lifestyle advice for LDL reduction including reduction of red meat consumption concentrating on a plant based diet. 30 min aerobic exercise per day. Calorie reduction to target BMI<25 PVD f/u Dr Hunt  on DOAC target LDL < 70

## 2024-08-20 NOTE — DISCUSSION/SUMMARY
[EKG obtained to assist in diagnosis and management of assessed problem(s)] : EKG obtained to assist in diagnosis and management of assessed problem(s) [FreeTextEntry1] : Assessment  Smoker  smoking cessation discussed   Lvef 55% MILD TR MR  Carotid < 50 %  contune exercise  target LDL < 55  continue xarelto and  asa and sttain

## 2024-09-23 ENCOUNTER — APPOINTMENT (OUTPATIENT)
Dept: PULMONOLOGY | Facility: CLINIC | Age: 59
End: 2024-09-23
Payer: COMMERCIAL

## 2024-09-23 VITALS
TEMPERATURE: 97.5 F | OXYGEN SATURATION: 96 % | DIASTOLIC BLOOD PRESSURE: 73 MMHG | WEIGHT: 138 LBS | BODY MASS INDEX: 23.56 KG/M2 | SYSTOLIC BLOOD PRESSURE: 117 MMHG | HEART RATE: 63 BPM | HEIGHT: 64 IN

## 2024-09-23 PROCEDURE — 99213 OFFICE O/P EST LOW 20 MIN: CPT

## 2024-09-24 NOTE — HISTORY OF PRESENT ILLNESS
[Current] : current [Never] : never [>= 20 pack years] : >= 20 pack years [TextBox_4] :  59 yom with medical history of bladder polyps removed on , current smoker 2 cigs a day (40 pack yrs) with GGO on imaging. Former patient of Dr. Cortes last time seen on . Pt is here following for LCS, smoking, WTC exposure.   Occasional dry cough at night when laying flat. Takes night quill for relief. No other sx. ET: walks about 2 miles a day and walks daily able to do 5 subway steps.  Used to smoke 1/2 pack per day for 2 years ago and now 2 cigs a day. Uses nicotine patches twice a week which he gets it through 311. Pt is interested in trying anti-smoking aid however no quit date. Exposed to 911 aftermath. Approved for [facility]. Pt is scheduled for PFT through [facility] in .  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  23 Pt is a follow up after PFT done on August with [facility] office(results not scanned in the chart). Continues to smoke 1 cig/day. Still doing nicotine patches 3 times a week getting it from 311. He did not start Chantix, patches are working well. Had URI few days ago, getting dry cough taking day quill. Symptoms seems to get better. Pt denies recent urgent care visits and hospitalizations. Able to climb 2 flights of subway stairs, difficulty breathing when he gets to the 3rd floor.   [TextBox_11] : 1 cig [TextBox_13] : 40

## 2024-09-24 NOTE — ASSESSMENT
[FreeTextEntry1] :  59 yom with medical history of bladder polyps removed on 2015, current smoker 2 cigs a day (40 pack yrs) with GGO on imaging. Former patient of Dr. Cortes last time seen on 06/23. Pt is here following for LCS, smoking, WTC exposure.   Reviewed: CT chest 04/23/24: Findings: Minimal inspissated mucus along the lateral walls of the tracheal air column (5:33) within the thoracic inlet region. There is mild paraseptal emphysema. Previously noted tree-in-bud micronodules and nonspecific groundglass opacities have resolved within the anteromedial basal segment of the left lower lobe. No developing suspicious parenchymal nodules or masses. Impression: Interval resolution of a focus of small airways inflammation involving anteromedial basal segment left lower lobe. No developing suspicious parenchymal nodules or masses. Lung-RADS category: 1 - Negative. No lung nodules or nodule with benign features.  Recommendation:12-month screening LDCT. Enrolled in LDCT next Ct chest due for 04/23/25 Advised pt to fax the PFT results. Pt will call us to review. If necessary, will repeat PFT/6mwt.  Encouraged to quit smoking. Plans to quit in November after returning back from vacation. he preferred to continue nicotine patch rather than starting chantix.  Unclear h/o DVT, based on documentation, he may have PAD. Arterial doppler results noted, (>75% stenosis of distal SFA). It is possible that xarelto was initiated for this reason. Will reach out to his vascular surgeon Jose M Grissom for clinical notes.   O/V in [facility] after PFT

## 2024-09-24 NOTE — HISTORY OF PRESENT ILLNESS
[Current] : current [Never] : never [>= 20 pack years] : >= 20 pack years [TextBox_4] :  59 yom with medical history of bladder polyps removed on , current smoker 2 cigs a day (40 pack yrs) with GGO on imaging. Former patient of Dr. Cortes last time seen on . Pt is here following for LCS, smoking, WTC exposure.   Occasional dry cough at night when laying flat. Takes night quill for relief. No other sx. ET: walks about 2 miles a day and walks daily able to do 5 subway steps.  Used to smoke 1/2 pack per day for 2 years ago and now 2 cigs a day. Uses nicotine patches twice a week which he gets it through 311. Pt is interested in trying anti-smoking aid however no quit date. Exposed to 911 aftermath. Approved for [facility]. Pt is scheduled for PFT through [facility] in .  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  23 Pt is a follow up after PFT done on August with [facility] office(results not scanned in the chart). Continues to smoke 1 cig/day. Still doing nicotine patches 3 times a week getting it from 311. He did not start Chantix, patches are working well. Had URI few days ago, getting dry cough taking day quill. Symptoms seems to get better. Pt denies recent urgent care visits and hospitalizations. Able to climb 2 flights of subway stairs, difficulty breathing when he gets to the 3rd floor.   [TextBox_11] : 1 cig [TextBox_13] : 40

## 2024-09-24 NOTE — ASSESSMENT
[FreeTextEntry1] :  59 yom with medical history of bladder polyps removed on 2015, current smoker 2 cigs a day (40 pack yrs) with GGO on imaging. Former patient of Dr. Cortes last time seen on 06/23. Pt is here following for LCS, smoking, WTC exposure.   Reviewed: CT chest 04/23/24: Findings: Minimal inspissated mucus along the lateral walls of the tracheal air column (5:33) within the thoracic inlet region. There is mild paraseptal emphysema. Previously noted tree-in-bud micronodules and nonspecific groundglass opacities have resolved within the anteromedial basal segment of the left lower lobe. No developing suspicious parenchymal nodules or masses. Impression: Interval resolution of a focus of small airways inflammation involving anteromedial basal segment left lower lobe. No developing suspicious parenchymal nodules or masses. Lung-RADS category: 1 - Negative. No lung nodules or nodule with benign features.  Recommendation:12-month screening LDCT. Enrolled in LDCT next Ct chest due for 04/23/25 Advised pt to fax the PFT results. Pt will call us to review. If necessary, will repeat PFT/6mwt.  Encouraged to quit smoking. Plans to quit in November after returning back from vacation. he preferred to continue nicotine patch rather than starting chantix.  Unclear h/o DVT, based on documentation, he may have PAD. Arterial doppler results noted, (>75% stenosis of distal SFA). It is possible that xarelto was initiated for this reason. Will reach out to his vascular surgeon Jose M Grissom for clinical notes.   O/V in [facility] after PFT

## 2024-09-24 NOTE — ASSESSMENT
[FreeTextEntry1] :  59 yom with medical history of bladder polyps removed on 2015, current smoker 2 cigs a day (40 pack yrs) with GGO on imaging. Former patient of Dr. Cortes last time seen on 06/23. Pt is here following for LCS, smoking, WTC exposure.   Reviewed: CT chest 04/23/24: Findings: Minimal inspissated mucus along the lateral walls of the tracheal air column (5:33) within the thoracic inlet region. There is mild paraseptal emphysema. Previously noted tree-in-bud micronodules and nonspecific groundglass opacities have resolved within the anteromedial basal segment of the left lower lobe. No developing suspicious parenchymal nodules or masses. Impression: Interval resolution of a focus of small airways inflammation involving anteromedial basal segment left lower lobe. No developing suspicious parenchymal nodules or masses. Lung-RADS category: 1 - Negative. No lung nodules or nodule with benign features.  Recommendation:12-month screening LDCT. Enrolled in LDCT next Ct chest due for 04/23/25 Advised pt to fax the PFT results. Pt will call us to review. If necessary, will repeat PFT/6mwt.  Encouraged to quit smoking. Plans to quit in November after returning back from vacation. he preferred to continue nicotine patch rather than starting chantix.  Unclear h/o DVT, based on documentation, he may have PAD. Arterial doppler results noted, (>75% stenosis of distal SFA). It is possible that xarelto was initiated for this reason. Will reach out to his vascular surgeon Jose M Grissom for clinical notes.   O/V in [facility] after PFT

## 2024-09-24 NOTE — END OF VISIT
[FreeTextEntry3] :  I, personally performed the evaluation and management (E/M) services for this established patient who presents today with (a) new problem(s)/exacerbation of (an) existing condition(s).  That E/M includes conducting the examination, assessing all new/exacerbated conditions, and establishing a new plan of care.  Today, my ACP was here to observe and participate on evaluation and management services for this new problem/exacerbated condition to be followed going forward. [Time Spent: ___ minutes] : I have spent [unfilled] minutes of time on the encounter which excludes teaching and separately reported services.

## 2024-09-24 NOTE — HISTORY OF PRESENT ILLNESS
[Current] : current [Never] : never [>= 20 pack years] : >= 20 pack years [TextBox_4] :  59 yom with medical history of bladder polyps removed on , current smoker 2 cigs a day (40 pack yrs) with GGO on imaging. Former patient of Dr. Cortes last time seen on . Pt is here following for LCS, smoking, WTC exposure.   Occasional dry cough at night when laying flat. Takes night quill for relief. No other sx. ET: walks about 2 miles a day and walks daily able to do 5 subway steps.  Used to smoke 1/2 pack per day for 2 years ago and now 2 cigs a day. Uses nicotine patches twice a week which he gets it through 311. Pt is interested in trying anti-smoking aid however no quit date. Exposed to 911 aftermath. Approved for [facility]. Pt is scheduled for PFT through [facility] in .  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  23 Pt is a follow up after PFT done on August with [facility] office(results not scanned in the chart). Continues to smoke 1 cig/day. Still doing nicotine patches 3 times a week getting it from 311. He did not start Chantix, patches are working well. Had URI few days ago, getting dry cough taking day quill. Symptoms seems to get better. Pt denies recent urgent care visits and hospitalizations. Able to climb 2 flights of subway stairs, difficulty breathing when he gets to the 3rd floor.   [TextBox_11] : 1 cig [TextBox_13] : 40

## 2024-09-24 NOTE — REVIEW OF SYSTEMS
[Cough] : cough [Negative] : Endocrine [Chest Tightness] : no chest tightness [Dyspnea] : no dyspnea [Wheezing] : no wheezing [SOB on Exertion] : sob on exertion

## 2025-01-30 ENCOUNTER — APPOINTMENT (OUTPATIENT)
Dept: PULMONOLOGY | Facility: CLINIC | Age: 60
End: 2025-01-30

## 2025-03-03 ENCOUNTER — APPOINTMENT (OUTPATIENT)
Dept: HEART AND VASCULAR | Facility: CLINIC | Age: 60
End: 2025-03-03

## 2025-03-03 ENCOUNTER — NON-APPOINTMENT (OUTPATIENT)
Age: 60
End: 2025-03-03

## 2025-03-03 ENCOUNTER — APPOINTMENT (OUTPATIENT)
Dept: HEART AND VASCULAR | Facility: CLINIC | Age: 60
End: 2025-03-03
Payer: COMMERCIAL

## 2025-03-03 VITALS
DIASTOLIC BLOOD PRESSURE: 75 MMHG | RESPIRATION RATE: 17 BRPM | SYSTOLIC BLOOD PRESSURE: 110 MMHG | HEIGHT: 64 IN | HEART RATE: 57 BPM | WEIGHT: 140 LBS | BODY MASS INDEX: 23.9 KG/M2

## 2025-03-03 DIAGNOSIS — R09.89 OTHER SPECIFIED SYMPTOMS AND SIGNS INVOLVING THE CIRCULATORY AND RESPIRATORY SYSTEMS: ICD-10-CM

## 2025-03-03 DIAGNOSIS — R07.9 CHEST PAIN, UNSPECIFIED: ICD-10-CM

## 2025-03-03 DIAGNOSIS — Z00.00 ENCOUNTER FOR GENERAL ADULT MEDICAL EXAMINATION W/OUT ABNORMAL FINDINGS: ICD-10-CM

## 2025-03-03 PROCEDURE — 93306 TTE W/DOPPLER COMPLETE: CPT

## 2025-03-03 PROCEDURE — 99214 OFFICE O/P EST MOD 30 MIN: CPT | Mod: 25

## 2025-03-03 PROCEDURE — 93000 ELECTROCARDIOGRAM COMPLETE: CPT

## 2025-03-03 PROCEDURE — 93880 EXTRACRANIAL BILAT STUDY: CPT
